# Patient Record
Sex: MALE | URBAN - METROPOLITAN AREA
[De-identification: names, ages, dates, MRNs, and addresses within clinical notes are randomized per-mention and may not be internally consistent; named-entity substitution may affect disease eponyms.]

---

## 2024-02-22 ENCOUNTER — APPOINTMENT (OUTPATIENT)
Dept: URBAN - METROPOLITAN AREA CLINIC 217 | Age: 39
Setting detail: DERMATOLOGY
End: 2024-02-26

## 2024-02-22 DIAGNOSIS — B35.8 OTHER DERMATOPHYTOSES: ICD-10-CM

## 2024-02-22 PROCEDURE — 99204 OFFICE O/P NEW MOD 45 MIN: CPT

## 2024-02-22 PROCEDURE — OTHER DIAGNOSIS COMMENT: OTHER

## 2024-02-22 PROCEDURE — OTHER PRESCRIPTION MEDICATION MANAGEMENT: OTHER

## 2024-02-22 PROCEDURE — OTHER MIPS QUALITY: OTHER

## 2024-02-22 PROCEDURE — OTHER ORDER TESTS: OTHER

## 2024-02-22 PROCEDURE — OTHER PRESCRIPTION: OTHER

## 2024-02-22 PROCEDURE — OTHER COUNSELING: OTHER

## 2024-02-22 RX ORDER — ECONAZOLE NITRATE 10 MG/G
CREAM TOPICAL
Qty: 85 | Refills: 3 | Status: ERX | COMMUNITY
Start: 2024-02-22

## 2024-02-22 ASSESSMENT — LOCATION SIMPLE DESCRIPTION DERM
LOCATION SIMPLE: LEFT FOOT
LOCATION SIMPLE: RIGHT FOOT
LOCATION SIMPLE: RIGHT POSTERIOR THIGH

## 2024-02-22 ASSESSMENT — LOCATION DETAILED DESCRIPTION DERM
LOCATION DETAILED: RIGHT DORSAL FOOT
LOCATION DETAILED: RIGHT PROXIMAL POSTERIOR THIGH
LOCATION DETAILED: LEFT DORSAL FOOT

## 2024-02-22 ASSESSMENT — LOCATION ZONE DERM
LOCATION ZONE: FEET
LOCATION ZONE: LEG

## 2024-02-22 ASSESSMENT — SEVERITY ASSESSMENT: SEVERITY: MILD

## 2024-02-22 NOTE — PROCEDURE: MIPS QUALITY
Quality 226: Preventive Care And Screening: Tobacco Use: Screening And Cessation Intervention: Patient screened for tobacco use, is a smoker AND received Cessation Counseling within measurement period or in the six months prior to the measurement period
Quality 431: Preventive Care And Screening: Unhealthy Alcohol Use - Screening: Patient not identified as an unhealthy alcohol user when screened for unhealthy alcohol use using a systematic screening method
Quality 130: Documentation Of Current Medications In The Medical Record: Current Medications Documented
Detail Level: Detailed
Quality 110: Preventive Care And Screening: Influenza Immunization: Influenza Immunization not Administered because Patient Refused.
Quality 486: Dermatitis - Improvement In Patient-Reported Itch Severity: Itch severity assessment score was not reduced by at least 2 points from the initial (index) score to the follow-up visit score or assessment was not completed during the follow-up encounter

## 2024-02-22 NOTE — PROCEDURE: DIAGNOSIS COMMENT
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
Detail Level: Simple
Render Risk Assessment In Note?: no

## 2024-02-22 NOTE — PROCEDURE: PRESCRIPTION MEDICATION MANAGEMENT
Plan: Mutually agree to culture at this time.  Will empirically treat with econazole 1 % topical cream.  Follow up in 4-6 weeks to reassess.  Await culture results. May treat with oral Terbinafine in future if need be
Initiate Treatment: econazole 1 % topical cream:  Apply BID to affected area on feet, legs and buttocks BID PRN.
Render In Strict Bullet Format?: No
Detail Level: Zone

## 2024-02-22 NOTE — PROCEDURE: ORDER TESTS
Billing Type: Third-Party Bill
Expected Date Of Service: 02/22/2024
Bill For Surgical Tray: no
Performing Laboratory: 0

## 2024-02-22 NOTE — HPI: RASH
Is This A New Presentation, Or A Follow-Up?: Rash
Additional History: Patient expresses concern of a rash on his left upper thigh and near his buttocks. Patiwnt states that the rash is red and sometimes bumpy, but denies any symptoms associated with it. Patiwnt states that the rash has been present for years but seems to come and go, but never completely clears. Patient states that the rash does not bother him, but notes that none of the topicals he has tried have helped. Patient states that he has tried OTC Lotramin and OTC HC cream, but denies any improvement. Pateint states that none of the topicals made the rash worse either.\\n\\nNote: the new paper history and intake form was reviewed at time of visit, but the data was not entered yet into [facility].

## 2024-03-26 ENCOUNTER — APPOINTMENT (OUTPATIENT)
Dept: URBAN - METROPOLITAN AREA CLINIC 217 | Age: 39
Setting detail: DERMATOLOGY
End: 2024-03-27

## 2024-03-26 DIAGNOSIS — B35.8 OTHER DERMATOPHYTOSES: ICD-10-CM

## 2024-03-26 PROCEDURE — OTHER DIAGNOSIS COMMENT: OTHER

## 2024-03-26 PROCEDURE — OTHER PRESCRIPTION MEDICATION MANAGEMENT: OTHER

## 2024-03-26 PROCEDURE — OTHER PRESCRIPTION: OTHER

## 2024-03-26 PROCEDURE — 99214 OFFICE O/P EST MOD 30 MIN: CPT

## 2024-03-26 PROCEDURE — OTHER COUNSELING: OTHER

## 2024-03-26 RX ORDER — TERBINAFINE HYDROCHLORIDE 250 MG/1
TABLET ORAL
Qty: 14 | Refills: 0 | Status: ERX | COMMUNITY
Start: 2024-03-26

## 2024-03-26 ASSESSMENT — SEVERITY ASSESSMENT: SEVERITY: MILD

## 2024-03-26 ASSESSMENT — LOCATION SIMPLE DESCRIPTION DERM
LOCATION SIMPLE: LEFT FOOT
LOCATION SIMPLE: RIGHT FOOT
LOCATION SIMPLE: RIGHT POSTERIOR THIGH

## 2024-03-26 ASSESSMENT — LOCATION ZONE DERM
LOCATION ZONE: FEET
LOCATION ZONE: LEG

## 2024-03-26 ASSESSMENT — LOCATION DETAILED DESCRIPTION DERM
LOCATION DETAILED: RIGHT PROXIMAL POSTERIOR THIGH
LOCATION DETAILED: LEFT DORSAL FOOT
LOCATION DETAILED: RIGHT DORSAL FOOT

## 2024-03-26 NOTE — PROCEDURE: PRESCRIPTION MEDICATION MANAGEMENT
Plan: Mutually agree to continue treatment with econazole 1% topical cream twice daily since responding and initiate terbinafine HCl 250mg daily for two weeks.  Fungal culture results pending.  Follow up in six weeks to reassess.
Initiate Treatment: terbinafine HCl 250 mg tablet: Take one tablet daily x14 days
Render In Strict Bullet Format?: No
Continue Regimen: econazole 1 % topical cream:  Apply BID to affected area on feet, legs and buttocks BID PRN.
Detail Level: Zone

## 2024-04-04 ENCOUNTER — RX ONLY (RX ONLY)
Age: 39
End: 2024-04-04

## 2024-04-04 RX ORDER — TERBINAFINE HYDROCHLORIDE 250 MG/1
TABLET ORAL
Qty: 14 | Refills: 0 | Status: ERX

## 2024-05-07 ENCOUNTER — APPOINTMENT (OUTPATIENT)
Dept: URBAN - METROPOLITAN AREA CLINIC 217 | Age: 39
Setting detail: DERMATOLOGY
End: 2024-05-09

## 2024-05-07 DIAGNOSIS — B35.8 OTHER DERMATOPHYTOSES: ICD-10-CM

## 2024-05-07 DIAGNOSIS — B35.3 TINEA PEDIS: ICD-10-CM

## 2024-05-07 PROCEDURE — 99213 OFFICE O/P EST LOW 20 MIN: CPT

## 2024-05-07 PROCEDURE — OTHER PRESCRIPTION MEDICATION MANAGEMENT: OTHER

## 2024-05-07 PROCEDURE — OTHER DIAGNOSIS COMMENT: OTHER

## 2024-05-07 PROCEDURE — OTHER ORDER TESTS: OTHER

## 2024-05-07 PROCEDURE — OTHER COUNSELING: OTHER

## 2024-05-07 ASSESSMENT — SEVERITY ASSESSMENT
SEVERITY: MODERATE
SEVERITY: ALMOST CLEAR

## 2024-05-07 NOTE — PROCEDURE: PRESCRIPTION MEDICATION MANAGEMENT
History of total knee replacement, left
Plan: Mutually agree to hold econazole cream at this time since resolving, encouraged patient to keep topical on hand in case area reoccurs. Mutually agree to f/u PRN or sooner if worsening
Render In Strict Bullet Format?: No
Continue Regimen: econazole 1 % topical cream:  Apply BID to affected area on feet, legs and buttocks BID PRN when active, stop when clear
Detail Level: Zone
Plan: Discussed option of oral terbinafine for 3 months to clear affected areas. Discussed side effects associated with medication, as well as liver toxicity risks. Mutually agree to initiate treatment once initial HFP received and reviewed, [facility] Mutually agree to spot check labs 4-6 weeks into treatment.
Initiate Treatment: Terbinafine 250 mg PO daily x3 months

## 2024-05-07 NOTE — PROCEDURE: DIAGNOSIS COMMENT
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
Detail Level: Simple
Render Risk Assessment In Note?: no

## 2024-05-21 ENCOUNTER — RX ONLY (RX ONLY)
Age: 39
End: 2024-05-21

## 2024-05-21 RX ORDER — TERBINAFINE HYDROCHLORIDE 250 MG/1
TABLET ORAL
Qty: 30 | Refills: 2 | Status: ERX

## 2024-07-03 ENCOUNTER — APPOINTMENT (OUTPATIENT)
Dept: URBAN - METROPOLITAN AREA CLINIC 217 | Age: 39
Setting detail: DERMATOLOGY
End: 2024-07-03

## 2024-07-03 DIAGNOSIS — B35.8 OTHER DERMATOPHYTOSES: ICD-10-CM

## 2024-07-03 DIAGNOSIS — B35.3 TINEA PEDIS: ICD-10-CM

## 2024-07-03 PROCEDURE — OTHER PRESCRIPTION MEDICATION MANAGEMENT: OTHER

## 2024-07-03 PROCEDURE — OTHER COUNSELING: OTHER

## 2024-07-03 PROCEDURE — 99213 OFFICE O/P EST LOW 20 MIN: CPT

## 2024-07-03 PROCEDURE — OTHER ORDER TESTS: OTHER

## 2024-07-03 PROCEDURE — OTHER DIAGNOSIS COMMENT: OTHER

## 2024-07-03 ASSESSMENT — SEVERITY ASSESSMENT
SEVERITY: CLEAR
SEVERITY: MILD TO MODERATE

## 2024-07-03 NOTE — PROCEDURE: PRESCRIPTION MEDICATION MANAGEMENT
Render In Strict Bullet Format?: No
Continue Regimen: econazole 1 % topical cream:  Apply BID to affected area on feet, legs and buttocks BID PRN when active, stop when clear
Detail Level: Zone
Plan: Patient did not get repeat blood work as directed. Advised he obtain to monitor liver while on drug. Order given again today in office. Will have him finish our treatment as directed and f/u PRN
Continue Regimen: Terbinafine 250 mg PO daily x3 months

## 2025-03-31 NOTE — PROCEDURE: DIAGNOSIS COMMENT
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
Detail Level: Simple
Render Risk Assessment In Note?: no
[id number]